# Patient Record
Sex: MALE | Race: WHITE | NOT HISPANIC OR LATINO | Employment: FULL TIME | URBAN - NONMETROPOLITAN AREA
[De-identification: names, ages, dates, MRNs, and addresses within clinical notes are randomized per-mention and may not be internally consistent; named-entity substitution may affect disease eponyms.]

---

## 2020-06-04 ENCOUNTER — TRANSCRIBE ORDERS (OUTPATIENT)
Dept: ADMINISTRATIVE | Facility: HOSPITAL | Age: 55
End: 2020-06-04

## 2020-06-04 DIAGNOSIS — Z01.818 PRE-OPERATIVE CLEARANCE: Primary | ICD-10-CM

## 2020-06-08 ENCOUNTER — LAB (OUTPATIENT)
Dept: LAB | Facility: HOSPITAL | Age: 55
End: 2020-06-08

## 2020-06-08 DIAGNOSIS — Z01.818 PRE-OPERATIVE CLEARANCE: ICD-10-CM

## 2020-06-08 PROCEDURE — C9803 HOPD COVID-19 SPEC COLLECT: HCPCS

## 2020-06-08 PROCEDURE — U0004 COV-19 TEST NON-CDC HGH THRU: HCPCS | Performed by: SURGERY

## 2020-06-08 PROCEDURE — U0002 COVID-19 LAB TEST NON-CDC: HCPCS | Performed by: SURGERY

## 2020-06-09 LAB
REF LAB TEST METHOD: NORMAL
SARS-COV-2 RNA RESP QL NAA+PROBE: NOT DETECTED

## 2021-02-25 ENCOUNTER — OFFICE VISIT (OUTPATIENT)
Dept: UROLOGY | Facility: CLINIC | Age: 56
End: 2021-02-25

## 2021-02-25 VITALS — BODY MASS INDEX: 26.66 KG/M2 | TEMPERATURE: 98.4 F | WEIGHT: 180 LBS | HEIGHT: 69 IN

## 2021-02-25 DIAGNOSIS — R31.29 MICROSCOPIC HEMATURIA: ICD-10-CM

## 2021-02-25 DIAGNOSIS — N30.00 ACUTE CYSTITIS WITHOUT HEMATURIA: ICD-10-CM

## 2021-02-25 DIAGNOSIS — R35.0 FREQUENCY OF URINATION: Primary | ICD-10-CM

## 2021-02-25 LAB
BILIRUB BLD-MCNC: NEGATIVE MG/DL
CLARITY, POC: CLEAR
COLOR UR: YELLOW
GLUCOSE UR STRIP-MCNC: NEGATIVE MG/DL
KETONES UR QL: NEGATIVE
LEUKOCYTE EST, POC: NEGATIVE
NITRITE UR-MCNC: NEGATIVE MG/ML
PH UR: 6 [PH] (ref 5–8)
PROT UR STRIP-MCNC: NEGATIVE MG/DL
RBC # UR STRIP: ABNORMAL /UL
SP GR UR: 1.02 (ref 1–1.03)
UROBILINOGEN UR QL: NORMAL

## 2021-02-25 PROCEDURE — 81003 URINALYSIS AUTO W/O SCOPE: CPT | Performed by: UROLOGY

## 2021-02-25 PROCEDURE — 99203 OFFICE O/P NEW LOW 30 MIN: CPT | Performed by: UROLOGY

## 2021-02-25 RX ORDER — PRAVASTATIN SODIUM 20 MG
20 TABLET ORAL
COMMUNITY
Start: 2020-03-16

## 2021-02-25 RX ORDER — LISINOPRIL 10 MG/1
10 TABLET ORAL
COMMUNITY
Start: 2021-01-28

## 2021-03-01 PROBLEM — N39.0 URINARY TRACT INFECTION WITHOUT HEMATURIA: Status: ACTIVE | Noted: 2021-03-01

## 2021-03-01 PROBLEM — R31.29 MICROSCOPIC HEMATURIA: Status: ACTIVE | Noted: 2021-03-01

## 2021-03-15 ENCOUNTER — HOSPITAL ENCOUNTER (OUTPATIENT)
Dept: CT IMAGING | Facility: HOSPITAL | Age: 56
Discharge: HOME | End: 2021-03-15
Admitting: UROLOGY

## 2021-03-15 DIAGNOSIS — R35.0 FREQUENCY OF URINATION: ICD-10-CM

## 2021-03-15 PROCEDURE — 25010000002 IOPAMIDOL 61 % SOLUTION: Performed by: UROLOGY

## 2021-03-15 PROCEDURE — 74178 CT ABD&PLV WO CNTR FLWD CNTR: CPT

## 2021-03-15 PROCEDURE — 74178 CT ABD&PLV WO CNTR FLWD CNTR: CPT | Performed by: RADIOLOGY

## 2021-03-15 RX ADMIN — IOPAMIDOL 100 ML: 612 INJECTION, SOLUTION INTRAVENOUS at 14:00

## 2021-05-06 ENCOUNTER — OFFICE VISIT (OUTPATIENT)
Dept: UROLOGY | Facility: CLINIC | Age: 56
End: 2021-05-06

## 2021-05-06 VITALS — TEMPERATURE: 98.4 F | HEIGHT: 69 IN | BODY MASS INDEX: 26.64 KG/M2 | WEIGHT: 179.9 LBS

## 2021-05-06 DIAGNOSIS — N41.1 CHRONIC PROSTATITIS: Primary | ICD-10-CM

## 2021-05-06 PROCEDURE — 99213 OFFICE O/P EST LOW 20 MIN: CPT | Performed by: UROLOGY

## 2021-05-06 NOTE — PROGRESS NOTES
"Chief Complaint:          Chief Complaint   Patient presents with   • REVIEW CT SCAN       HPI:   55 y.o. male here for review of his CT scan he gets a PSA every 12 months at the [facility] but he is told it was \"good\".  CT showed multiple air-fluid levels a lot of retained stool.  Prostate calcifications consistent with his just recent episode of prostatitis I gave him reassurance I do not think he needs any treatment at this time we will follow up with him based on this      Past Medical History:        Past Medical History:   Diagnosis Date   • Urinary tract infection without hematuria 3/1/2021         Current Meds:     Current Outpatient Medications   Medication Sig Dispense Refill   • lisinopril (Zestril) 10 MG tablet Take 10 mg by mouth.     • pravastatin (PRAVACHOL) 20 MG tablet Take 20 mg by mouth.       No current facility-administered medications for this visit.        Allergies:      No Known Allergies     Past Surgical History:     Past Surgical History:   Procedure Laterality Date   • VASECTOMY           Social History:     Social History     Socioeconomic History   • Marital status:      Spouse name: Not on file   • Number of children: Not on file   • Years of education: Not on file   • Highest education level: Not on file   Tobacco Use   • Smoking status: Never Smoker   • Smokeless tobacco: Never Used   Substance and Sexual Activity   • Alcohol use: Yes   • Drug use: Never   • Sexual activity: Defer       Family History:     Family History   Problem Relation Age of Onset   • No Known Problems Father    • No Known Problems Mother        Review of Systems:     Review of Systems   Constitutional: Negative.    HENT: Negative.    Eyes: Negative.    Respiratory: Negative.    Cardiovascular: Negative.    Gastrointestinal: Negative.    Endocrine: Negative.    Musculoskeletal: Negative.    Allergic/Immunologic: Negative.    Neurological: Negative.    Hematological: Negative.    "   Psychiatric/Behavioral: Negative.        Physical Exam:     Physical Exam  Vitals and nursing note reviewed.   Constitutional:       Appearance: He is well-developed.   HENT:      Head: Normocephalic and atraumatic.   Eyes:      Conjunctiva/sclera: Conjunctivae normal.      Pupils: Pupils are equal, round, and reactive to light.   Cardiovascular:      Rate and Rhythm: Normal rate and regular rhythm.      Heart sounds: Normal heart sounds.   Pulmonary:      Effort: Pulmonary effort is normal.      Breath sounds: Normal breath sounds.   Abdominal:      General: Bowel sounds are normal.      Palpations: Abdomen is soft.   Musculoskeletal:         General: Normal range of motion.      Cervical back: Normal range of motion.   Skin:     General: Skin is warm and dry.   Neurological:      Mental Status: He is alert and oriented to person, place, and time.      Deep Tendon Reflexes: Reflexes are normal and symmetric.   Psychiatric:         Behavior: Behavior normal.         Thought Content: Thought content normal.         Judgment: Judgment normal.         I have reviewed the following portions of the patient's history: allergies, current medications, past family history, past medical history, past social history, past surgical history, problem list and ROS and confirm it's accurate.      Procedure:       Assessment/Plan:   PSA testing-I am recommending a PSA blood test that stands for prostate specific antigen.  I discussed the pathophysiology of PSA testing indicating its use in the diagnosis and management of prostate cancer.  I discussed the normal range being 0-4 but more appropriately being much closer to 0-2 in a normal male.  I discussed the fact that after certain age we don't recommend PSA testing especially in view of numerous comorbidities.  That this will not be a useful test.  I discussed many of the things that can artificially raised PSA including a recent infection, urinary tract infection, recent sexual  intercourse.  Where even the type of movement such as manipulation of the prostate  riding a bicycle.  After all this is taken into account when the test is reviewed  the most important use of PSA which is the velocity measurement in other words the change of PSA with time as a very important factor in the use and that we look for greater than 20% rise over a year to help us make the prediction of prostate cancer.  I also discussed that the use with prostate cancer indicating that after a radical prostatectomy the PSA should be 0 and any rise indicates an early biochemical recurrence he states his PSAs are normal per [facility]  Prostatitis-we discussed the differential diagnosis of prostatitis including the National Institutes of Health classification system I through IV.  I discussed that type I prostatitis is  acute bacterial prostatitis and an associated with high fevers typically hematuria and severe pain with voiding.  We discussed the fact that it necessitates 6 weeks of chronic antibiotics to be sure it doesn't turn into a chronic bacterial prostatitis that can have lifelong ramifications.  We discussed National Institutes of Health type II chronic bacterial prostatitis.  We discussed the fact that this a chronic bacterial infection of the prostate that often requires suppressive antibiotics.  We discussed type III being related more to nonspecific urethritis as well as tight for being related to finding inflammation and a biopsy in the absence of symptomatology.  I discussed the diagnostic strategies including the VB 1 through 4 urine culture and discussed empiric therapy.  I emphasized that with the use of chronic antibiotics I strongly recommended the concomitant use of probiotics.  Additionally, we discussed the various antibiotics used and the risks and benefits associated with each particularly the use of long-term ciprofloxacin and the effect on joints and collagen in human beings.  He had an  episode of prostatitis and has residual prostatic calcifications  IBS-patient has significant irritable bowel syndrome characterized by extreme amounts of constipation.  We spoke about the impact of this on bladder function.  And its relationship to recurrent urinary tract infections.  We discussed the need for increasing fluid and discussed the physiology of colonic motility as well as use of MiraLAX as a bulk laxative versus the newer class of serotonin uptake blockers such as Linzess.  We stressed the need for a daily bowel movement and discussed the Davis stool scale at length.  We also discussed a referral to the GI nurse practitioner recommend probiotics                This document has been electronically signed by MARLINE AZEVEDO MD May 6, 2021 13:57 EDT

## 2021-05-07 PROBLEM — N41.1 CHRONIC PROSTATITIS: Status: ACTIVE | Noted: 2021-05-07

## 2022-11-15 ENCOUNTER — OFFICE VISIT (OUTPATIENT)
Dept: UROLOGY | Facility: CLINIC | Age: 57
End: 2022-11-15

## 2022-11-15 VITALS — BODY MASS INDEX: 26.51 KG/M2 | WEIGHT: 179 LBS | HEIGHT: 69 IN

## 2022-11-15 DIAGNOSIS — N41.1 CHRONIC PROSTATITIS: Primary | ICD-10-CM

## 2022-11-15 LAB
BILIRUB BLD-MCNC: NEGATIVE MG/DL
CLARITY, POC: CLEAR
COLOR UR: YELLOW
EXPIRATION DATE: ABNORMAL
GLUCOSE UR STRIP-MCNC: NEGATIVE MG/DL
KETONES UR QL: NEGATIVE
LEUKOCYTE EST, POC: NEGATIVE
Lab: ABNORMAL
NITRITE UR-MCNC: NEGATIVE MG/ML
PH UR: 7 [PH] (ref 5–8)
PROT UR STRIP-MCNC: NEGATIVE MG/DL
RBC # UR STRIP: ABNORMAL /UL
SP GR UR: 1.01 (ref 1–1.03)
UROBILINOGEN UR QL: NORMAL

## 2022-11-15 PROCEDURE — 81003 URINALYSIS AUTO W/O SCOPE: CPT | Performed by: UROLOGY

## 2022-11-15 PROCEDURE — 99213 OFFICE O/P EST LOW 20 MIN: CPT | Performed by: UROLOGY

## 2022-11-16 ENCOUNTER — TELEPHONE (OUTPATIENT)
Dept: UROLOGY | Facility: CLINIC | Age: 57
End: 2022-11-16

## 2022-11-17 ENCOUNTER — TELEPHONE (OUTPATIENT)
Dept: UROLOGY | Facility: CLINIC | Age: 57
End: 2022-11-17

## 2022-11-17 RX ORDER — SULFAMETHOXAZOLE AND TRIMETHOPRIM 800; 160 MG/1; MG/1
1 TABLET ORAL 2 TIMES DAILY
Qty: 84 TABLET | Refills: 2 | Status: SHIPPED | OUTPATIENT
Start: 2022-11-17

## 2023-05-15 ENCOUNTER — OFFICE VISIT (OUTPATIENT)
Dept: UROLOGY | Facility: CLINIC | Age: 58
End: 2023-05-15
Payer: COMMERCIAL

## 2023-05-15 VITALS
HEART RATE: 83 BPM | HEIGHT: 69 IN | BODY MASS INDEX: 27.67 KG/M2 | SYSTOLIC BLOOD PRESSURE: 133 MMHG | WEIGHT: 186.8 LBS | DIASTOLIC BLOOD PRESSURE: 81 MMHG

## 2023-05-15 DIAGNOSIS — N41.1 CHRONIC PROSTATITIS: ICD-10-CM

## 2023-05-15 DIAGNOSIS — N30.00 ACUTE CYSTITIS WITHOUT HEMATURIA: Primary | ICD-10-CM

## 2023-05-15 PROCEDURE — 99213 OFFICE O/P EST LOW 20 MIN: CPT | Performed by: UROLOGY

## 2023-05-15 NOTE — PROGRESS NOTES
"Chief Complaint:      Chief Complaint   Patient presents with   • prostatitis        HPI:   57 y.o. male returns today.  Doing better status dribbling.  He had his thyroid out PSA was done per [facility] and was reportedly \"okay he is comfortable that he has prostatitis I will see him back on an as-needed basis.    Past Medical History:     Past Medical History:   Diagnosis Date   • Chronic prostatitis 5/7/2021   • Urinary tract infection without hematuria 3/1/2021       Current Meds:     Current Outpatient Medications   Medication Sig Dispense Refill   • lisinopril (PRINIVIL,ZESTRIL) 10 MG tablet Take 1 tablet by mouth.     • pravastatin (PRAVACHOL) 20 MG tablet Take 1 tablet by mouth.     • sulfamethoxazole-trimethoprim (Bactrim DS) 800-160 MG per tablet Take 1 tablet by mouth 2 (Two) Times a Day. 84 tablet 2     No current facility-administered medications for this visit.        Allergies:      No Known Allergies     Past Surgical History:     Past Surgical History:   Procedure Laterality Date   • VASECTOMY         Social History:     Social History     Socioeconomic History   • Marital status: Single   Tobacco Use   • Smoking status: Never   • Smokeless tobacco: Never   Substance and Sexual Activity   • Alcohol use: Yes   • Drug use: Never   • Sexual activity: Defer       Family History:     Family History   Problem Relation Age of Onset   • No Known Problems Father    • No Known Problems Mother        Review of Systems:     Review of Systems   Constitutional: Negative.    HENT: Negative.    Eyes: Negative.    Respiratory: Negative.    Cardiovascular: Negative.    Gastrointestinal: Negative.    Endocrine: Negative.    Musculoskeletal: Negative.    Allergic/Immunologic: Negative.    Neurological: Negative.    Hematological: Negative.    Psychiatric/Behavioral: Negative.        Physical Exam:     Physical Exam  Vitals and nursing note reviewed.   Constitutional:       Appearance: He is well-developed.   HENT:      " Head: Normocephalic and atraumatic.   Eyes:      Conjunctiva/sclera: Conjunctivae normal.      Pupils: Pupils are equal, round, and reactive to light.   Cardiovascular:      Rate and Rhythm: Normal rate and regular rhythm.      Heart sounds: Normal heart sounds.   Pulmonary:      Effort: Pulmonary effort is normal.      Breath sounds: Normal breath sounds.   Abdominal:      General: Bowel sounds are normal.      Palpations: Abdomen is soft.   Musculoskeletal:         General: Normal range of motion.      Cervical back: Normal range of motion.   Skin:     General: Skin is warm and dry.   Neurological:      Mental Status: He is alert and oriented to person, place, and time.      Deep Tendon Reflexes: Reflexes are normal and symmetric.   Psychiatric:         Behavior: Behavior normal.         Thought Content: Thought content normal.         Judgment: Judgment normal.         I have reviewed the following portions of the patient's history: Allergies, current medications, past family history, past medical history, past social history, past surgical history, problem list, and ROS and confirm it is accurate.    Recent Image (CT and/or KUB):      CT Abdomen and Pelvis: Results for orders placed during the hospital encounter of 03/15/21    CT Abdomen Pelvis With & Without Contrast    Narrative  EXAM:  CT Abdomen and Pelvis Without and With Intravenous Contrast    EXAM DATE:  3/15/2021 1:43 PM    CLINICAL HISTORY:  Hematuria, unknown cause; R35.0-Frequency of micturition    TECHNIQUE:  Axial computed tomography images of the abdomen and pelvis without and  with intravenous contrast.  Sagittal and coronal reformatted images were  created and reviewed.  This CT exam was performed using one or more of  the following dose reduction techniques:  automated exposure control,  adjustment of the mA and/or kV according to patient size, and/or use of  iterative reconstruction technique.    COMPARISON:  No relevant prior studies  available.    FINDINGS:  Lung bases:  Unremarkable.  No mass.  No consolidation.    ABDOMEN:  Liver:  Unremarkable.  No mass.  Gallbladder and bile ducts:  Unremarkable.  No calcified stones.  No  ductal dilation.  Pancreas:  Unremarkable.  No mass.  No ductal dilation.  Spleen:  Unremarkable.  No splenomegaly.  Adrenals:  Unremarkable.  No mass.  Kidneys and ureters:  No renal or ureteral stones.  No hydronephrosis.  No solid enhancing renal masses.  No renal perfusion defects.  Stomach and bowel:  Constipation.  No bowel obstruction.  No mucosal  thickening.    PELVIS:  Appendix:  Normal appendix.  Bladder:  Urinary bladder wall thickening.  Incomplete distention of  urinary bladder.  No stones.  Reproductive:  Marked prostate enlargement. Central calcifications.    ABDOMEN and PELVIS:  Intraperitoneal space:  Unremarkable.  No free air.  No significant  fluid collection.  Bones/joints:  Degenerative changes lower lumbar spine with stenosis  at the L5/S1 level.  No acute fracture.  No dislocation.  Soft tissues:  Tiny FAT only containing umbilical hernia.  Vasculature:  Mild atherosclerosis. No aneurysm.  Lymph nodes:  Unremarkable.  No enlarged lymph nodes.    Impression  1.  No renal or ureteral stones. No hydronephrosis.  2.  No solid enhancing renal masses.  3.  Prostate enlargement.  4.  Urinary bladder wall thickening. This can be seen with cystitis,  incomplete distention, or chronic partial bladder outlet obstruction.  5.  Other nonacute/incidental findings detailed above.    This report was finalized on 3/15/2021 3:48 PM by Dr. Lio Coronado MD.       CT Stone Protocol: No results found for this or any previous visit.       KUB: No results found for this or any previous visit.       Labs (past 3 months):      No visits with results within 3 Month(s) from this visit.   Latest known visit with results is:   Office Visit on 11/15/2022   Component Date Value Ref Range Status   • Color 11/15/2022 Yellow   Yellow, Straw, Dark Yellow, [place] Final   • Clarity, UA 11/15/2022 Clear  Clear Final   • Specific Gravity  11/15/2022 1.015  1.005 - 1.030 Final   • pH, Urine 11/15/2022 7.0  5.0 - 8.0 Final   • Leukocytes 11/15/2022 Negative  Negative Final   • Nitrite, UA 11/15/2022 Negative  Negative Final   • Protein, POC 11/15/2022 Negative  Negative mg/dL Final   • Glucose, UA 11/15/2022 Negative  Negative mg/dL Final   • Ketones, UA 11/15/2022 Negative  Negative Final   • Urobilinogen, UA 11/15/2022 Normal  Normal, 0.2 E.U./dL Final   • Bilirubin 11/15/2022 Negative  Negative Final   • Blood, UA 11/15/2022 2+ (A)  Negative Final   • Lot Number 11/15/2022 [id number]   Final   • Expiration Date 11/15/2022 20,824   Final        Procedure:       Assessment/Plan:   Prostatitis-we discussed the differential diagnosis of prostatitis including the National Institutes of Health classification system I through IV.  I discussed that type I prostatitis is acute bacterial prostatitis and associated with high fevers, typically hematuria, and severe pain with voiding.  We discussed the fact that it necessitates 6 weeks of chronic antibiotics to be sure it doesn't turn into a chronic bacterial prostatitis that can have lifelong ramifications.  We discussed National Institutes of Health type II chronic bacterial prostatitis.  We discussed the fact that this a chronic bacterial infection of the prostate that often requires suppressive antibiotics.  We discussed type III being related more to nonspecific urethritis, as well as tight for being related to finding inflammation and a biopsy in the absence of symptomatology.  I discussed the diagnostic strategies including the VB 1 through 4 urine culture and discussed empiric therapy.  I emphasized that with the use of chronic antibiotics, I strongly recommended the concomitant use of probiotics.  Additionally, we discussed the various antibiotics used and the risks and benefits associated  with each, particularly the use of long-term ciprofloxacin and the effect on joints and collagen in human beings.  Resolved          This document has been electronically signed by MARLINE AZEVEDO MD May 15, 2023 13:51 EDT    Dictated Utilizing Dragon Dictation: Part of this note may be an electronic transcription/translation of spoken language to printed text using the Dragon Dictation System.